# Patient Record
Sex: FEMALE | Race: BLACK OR AFRICAN AMERICAN | NOT HISPANIC OR LATINO | Employment: FULL TIME | URBAN - METROPOLITAN AREA
[De-identification: names, ages, dates, MRNs, and addresses within clinical notes are randomized per-mention and may not be internally consistent; named-entity substitution may affect disease eponyms.]

---

## 2022-05-26 ENCOUNTER — TELEPHONE (OUTPATIENT)
Dept: BARIATRICS | Facility: CLINIC | Age: 57
End: 2022-05-26

## 2022-05-26 ENCOUNTER — OFFICE VISIT (OUTPATIENT)
Dept: BARIATRICS | Facility: CLINIC | Age: 57
End: 2022-05-26
Payer: COMMERCIAL

## 2022-05-26 VITALS
SYSTOLIC BLOOD PRESSURE: 100 MMHG | BODY MASS INDEX: 39.56 KG/M2 | TEMPERATURE: 98.4 F | HEIGHT: 64 IN | HEART RATE: 86 BPM | DIASTOLIC BLOOD PRESSURE: 62 MMHG | WEIGHT: 231.7 LBS

## 2022-05-26 DIAGNOSIS — E66.9 OBESITY, CLASS II, BMI 35-39.9: Primary | ICD-10-CM

## 2022-05-26 DIAGNOSIS — Z12.31 ENCOUNTER FOR SCREENING MAMMOGRAM FOR MALIGNANT NEOPLASM OF BREAST: ICD-10-CM

## 2022-05-26 DIAGNOSIS — Z98.84 HISTORY OF BARIATRIC SURGERY: ICD-10-CM

## 2022-05-26 DIAGNOSIS — Z12.11 SCREENING FOR COLON CANCER: ICD-10-CM

## 2022-05-26 PROBLEM — E66.812 OBESITY, CLASS II, BMI 35-39.9: Status: ACTIVE | Noted: 2022-05-26

## 2022-05-26 PROCEDURE — 99204 OFFICE O/P NEW MOD 45 MIN: CPT | Performed by: NURSE PRACTITIONER

## 2022-05-26 NOTE — PROGRESS NOTES
0800:   Bedside report received from Thai Crockett RN, assumed care of pt. Rodney dual verified. 1030: Pt's wife at bedside, updates given. 1135: CVVH stopped in preparation for procedure. Successful return of blood (165 mL). 1114: Telephone report given to Sammie in cath lab. 1200: Pt transported to cath lab with this RN, RT, and cath lab RNs. Pt's wife aware and is going to [address] [place] [address] waiting room. 1316: Telephone report received from [employer] in cath lab. Pt will be up shortly. 1330: Spoke with Luis RN, aware that patient need to be put back on CVVH. 1350: Pt back in room. Left chest site clean, dry, intact. 1415: Pt's wife at bedside, updates given. 1425: FMS removed, logan care provided. 1445: Levophed gtt started to maintain SBP > 100. Bladder scanned pt- 51 mL shown. 1455: Pt's daughter at bedside, updates given. 1614: GI PA at bedside. Updates given. Asked about nutrition- OK for trickle feeds to the stomach, not the duodenum. 1622: CVVH restarted by Luis VASQUEZ. 1630: Dr. Pankaj Malone at bedside. Aware of GI reccs. Also discussed pt's agitation & restlessness while trying to wean propofol. Orders obtained for precedex. 1750: Dopamine gtt weaned off. 
 
9436: Tube feed started via OGT per orders. 2000: Bedside and Verbal shift change report given to Jackie TYLER (oncoming nurse) by Nusrat Frazier (offgoing nurse). Report included the following information SBAR, Kardex, MAR, Recent Results and Cardiac Rhythm Paced/NSR. Assessment/Plan:  Ifeanyi Ibrahim was seen today for consult  Diagnoses and all orders for this visit:    Obesity, Class II, BMI 35-39 9  - Discussed options of HealthyCORE-Intensive Lifestyle Intervention Program, Very Low Calorie Diet-VLCD and Conservative Program and the role of weight loss medications  - Explained the importance of making lifestyle changes first before starting any anti-obesity medications  Patient should demonstrate lifestyle changes first before anti-obesity medication can be initiated  - Patient is interested in pursuing Conservative Program +/- meal planning with dietician - patient will think about it  - Initial weight loss goal of 5-10% weight loss for improved health  - Weight loss can improve patient's co-morbid conditions and/or prevent weight-related complications  - Stop Dorita Carreradle 3/8  - Check screening labs: CMP, A1C, fasting insulin, TSH, and lipid panel  - She is interested in weight loss medications  She will check coverage of weight loss medications and update me at next office visit  - Was on Adderall before for ADHD, no side effects, but tapered off because she did not want to be on this long term  - Was on Topamax in past for weight loss, not helpful  Goals:  Do not skip meals  Food log (ie ) www myfitnesspal com,sparkpeople  com,loseit com,calorieking  com,etc  baritastic (use skinnytaste  com, dietdoctor  com or smartphone aramis [employer] for recipes)  No sugary beverages  At least 64oz of water daily  Increase physical activity by 10 minutes daily  Gradually increase physical activity to a goal of 5 days per week for 30 minutes of MODERATE intensity PLUS 2 days per week of FULL BODY resistance training (use smartphone apps [employer], Home Workout, etc )   - Start food logging, weighing and measuring including creamer and milk  - Keep up the great work with going to the gym - increase physical activity to 5 days per week for goal of 30 minutes     - Increase water to goal of at least 64 oz daily  Be mindful of creamer  Screening for colon cancer  -     Ambulatory referral to Gastroenterology; Future    Encounter for screening mammogram for malignant neoplasm of breast  -     Mammo diagnostic bilateral w 3d & cad; Future    History of bariatric surgery  - S/P gastric sleeve 8-9 years ago in [facility]  Will refer to surgical JUDD for routine follow-up  Follow up in approximately 2 months with Non-Surgical Physician/Advanced Practitioner  Subjective:   Chief Complaint   Patient presents with    Consult     MWM goal wt 150, S/B 3-8 neg        Patient ID: Laurence Spear  is a 61 y o  female with excess weight/obesity here to pursue weight management  Previous notes and records have been reviewed  Past Medical History:   Diagnosis Date    ADHD      Past Surgical History:   Procedure Laterality Date    SLEEVE GASTROPLASTY      surgery done in [facility]       HPI:  Wt Readings from Last 20 Encounters:   05/26/22 105 kg (231 lb 11 2 oz)     Obesity/Excess Weight:  Severity: Severe  Onset:  Since childhood, but worsened 7577-7246 and more so in the setting of the COVID-19 pandemic    Modifiers: Diet and Exercise and weight loss surgery 8-9 years ago  Contributing factors: Poor Food Choices and Stress/Emotional Eating  Associated symptoms: fatigue, decreased self esteem and clothes do not fit    S/P Sleeve Gastrectomy about 8-9 years ago in [facility]  Weight prior to surgery 260 lbs, mary 180 lbs few years after surgery  Has been regaining weight  Was previously on Adderall for ADHD, no side effects, but tapered off because she did not want to be on medications  Was on Topamax for weight loss in the past, not helpful  Postmenopausal - LMP 10 years ago       Hydration: 1 bottle of water, 20 oz coffee with creamer or whole milk or powdered creamer, 3-4 cups diet iced tea per month   Alcohol: 1 glass of wine every 2 weeks  Smoking: denies  Exercise: I am [employer] gym 3 days per week - 30 minutes with  then elliptical 20-30 minutes  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  Sleep: 5-6 hours  STOP bang: 3/8    Highest weight: 260 lbs  Current weight: 231 3 lbs  Goal weight: 150 lbs    Colonoscopy: due - referral placed  Mammogram: due - ordered    The following portions of the patient's history were reviewed and updated as appropriate: allergies, current medications, past family history, past medical history, past social history, past surgical history, and problem list     Review of Systems   HENT: Negative for sore throat  Respiratory: Negative for cough and shortness of breath  Cardiovascular: Negative for chest pain and palpitations  Gastrointestinal: Positive for abdominal pain (intermittent - previously evaluated) and constipation (advised to discuss with GI)  Negative for diarrhea, nausea and vomiting         + GERD - diet controlled   Endocrine: Negative for cold intolerance and heat intolerance  Genitourinary: Negative for dysuria  Musculoskeletal: Negative for arthralgias and back pain  Skin: Negative for rash  Neurological: Negative for headaches  Psychiatric/Behavioral: Negative for suicidal ideas (or HI)  + depression and anxiety - situational        Objective:  /62 (BP Location: Right arm, Patient Position: Sitting, Cuff Size: Standard)   Pulse 86   Temp 98 4 °F (36 9 °C) (Tympanic)   Ht 5' 4" (1 626 m)   Wt 105 kg (231 lb 11 2 oz)   BMI 39 77 kg/m²     Physical Exam  Vitals and nursing note reviewed  Constitutional   General appearance: Abnormal   well developed and morbidly obese  Eyes No conjunctival injection  Ears, Nose, Mouth, and Throat Oral mucosa moist    Pulmonary   Respiratory effort: No increased work of breathing or signs of respiratory distress  Cardiovascular    Examination of extremities for edema and/or varicosities: Normal   no edema  Abdomen   Abdomen: Abnormal   The abdomen was obese  Musculoskeletal   Gait and station: Normal     Psychiatric   Orientation to person, place and time: Normal     Affect: appropriate

## 2022-05-26 NOTE — TELEPHONE ENCOUNTER
Pt brought over from medical side, pt has hx sleeve gastrectomy in 2012  Pt unable to obtain op notes as it was done in [facility]  PCP records in Epic ([facility])  Transfer form given to Nhung

## 2022-06-01 DIAGNOSIS — Z98.84 BARIATRIC SURGERY STATUS: Primary | ICD-10-CM

## 2022-06-09 ENCOUNTER — TELEPHONE (OUTPATIENT)
Dept: GASTROENTEROLOGY | Facility: CLINIC | Age: 57
End: 2022-06-09

## 2022-06-09 NOTE — TELEPHONE ENCOUNTER
Scheduled date of colonoscopy (as of today):08 22 22  Physician performing colonoscopy:DR OCHOA  Location of colonoscopy:  Bowel prep reviewed with patient:DULCOLAX/MIRALAX  Instructions reviewed with patient by:DANIELLE VERBALLY/MAILED  Clearances: N/A      06/09/22  Screened by: [facility]    Referring Provider RAZA Crowder    Pre- Screening: Body mass index is 39 77 kg/m²  Has patient been referred for a routine screening Colonoscopy? yes  Is the patient between 39-70 years old? yes      Previous Colonoscopy yes   If yes:    Date:  Years ago    Facility:     Reason:       SCHEDULING STAFF: If the patient is between 45yrs-49yrs, please advise patient to confirm benefits/coverage with their insurance company for a routine screening colonoscopy, some insurance carriers will only cover at [address] or older  If the patient is over 66years old, please schedule an office visit  Does the patient want to see a Gastroenterologist prior to their procedure OR are they having any GI symptoms? no    Has the patient been hospitalized or had abdominal surgery in the past 6 months? no    Does the patient use supplemental oxygen? no    Does the patient take Coumadin, Lovenox, Plavix, Elliquis, Xarelto, or other blood thinning medication? no    Has the patient had a stroke, cardiac event, or stent placed in the past year? no    SCHEDULING STAFF: If patient answers NO to above questions, then schedule procedure  If patient answers YES to above questions, then schedule office appointment  If patient is between 45yrs - 49yrs, please advise patient that we will have to confirm benefits & coverage with their insurance company for a routine screening colonoscopy          Nurys Krause , call back #   [phone]

## 2022-06-20 ENCOUNTER — HOSPITAL ENCOUNTER (OUTPATIENT)
Dept: RADIOLOGY | Facility: HOSPITAL | Age: 57
Discharge: HOME | End: 2022-06-20
Attending: SURGERY
Payer: COMMERCIAL

## 2022-06-20 DIAGNOSIS — Z98.84 BARIATRIC SURGERY STATUS: ICD-10-CM

## 2022-06-20 PROCEDURE — 74240 X-RAY XM UPR GI TRC 1CNTRST: CPT

## 2022-06-21 ENCOUNTER — HOSPITAL ENCOUNTER (OUTPATIENT)
Dept: MAMMOGRAPHY | Facility: CLINIC | Age: 57
Discharge: HOME | End: 2022-06-21
Payer: COMMERCIAL

## 2022-06-21 VITALS — BODY MASS INDEX: 39.44 KG/M2 | WEIGHT: 231 LBS | HEIGHT: 64 IN

## 2022-06-21 DIAGNOSIS — Z12.31 ENCOUNTER FOR SCREENING MAMMOGRAM FOR MALIGNANT NEOPLASM OF BREAST: ICD-10-CM

## 2022-06-21 PROCEDURE — 77063 BREAST TOMOSYNTHESIS BI: CPT

## 2022-06-21 PROCEDURE — 77067 SCR MAMMO BI INCL CAD: CPT

## 2022-07-06 ENCOUNTER — TELEPHONE (OUTPATIENT)
Dept: BARIATRICS | Facility: CLINIC | Age: 57
End: 2022-07-06

## 2022-07-07 NOTE — TELEPHONE ENCOUNTER
Colon rescheduled to 9/12/22 with [facility]  New paperwork mailed to the patient

## 2022-07-07 NOTE — TELEPHONE ENCOUNTER
Received message from office staff that [facility] had returned my call  Called her back and left voice mail message for her to call the office back

## 2022-07-12 ENCOUNTER — APPOINTMENT (OUTPATIENT)
Dept: LAB | Facility: MEDICAL CENTER | Age: 57
End: 2022-07-12
Payer: COMMERCIAL

## 2022-07-12 DIAGNOSIS — E66.9 OBESITY, CLASS II, BMI 35-39.9: ICD-10-CM

## 2022-07-12 LAB
ALBUMIN SERPL BCP-MCNC: 3.6 G/DL (ref 3.5–5)
ALP SERPL-CCNC: 66 U/L (ref 46–116)
ALT SERPL W P-5'-P-CCNC: 16 U/L (ref 12–78)
ANION GAP SERPL CALCULATED.3IONS-SCNC: 5 MMOL/L (ref 4–13)
AST SERPL W P-5'-P-CCNC: 16 U/L (ref 5–45)
BILIRUB SERPL-MCNC: 0.44 MG/DL (ref 0.2–1)
BUN SERPL-MCNC: 14 MG/DL (ref 5–25)
CALCIUM SERPL-MCNC: 9.4 MG/DL (ref 8.3–10.1)
CHLORIDE SERPL-SCNC: 106 MMOL/L (ref 100–108)
CHOLEST SERPL-MCNC: 184 MG/DL
CO2 SERPL-SCNC: 28 MMOL/L (ref 21–32)
CREAT SERPL-MCNC: 0.82 MG/DL (ref 0.6–1.3)
EST. AVERAGE GLUCOSE BLD GHB EST-MCNC: 114 MG/DL
GFR SERPL CREATININE-BSD FRML MDRD: 79 ML/MIN/1.73SQ M
GLUCOSE P FAST SERPL-MCNC: 81 MG/DL (ref 65–99)
HBA1C MFR BLD: 5.6 %
HDLC SERPL-MCNC: 59 MG/DL
INSULIN SERPL-ACNC: 4.5 MU/L (ref 3–25)
LDLC SERPL CALC-MCNC: 111 MG/DL (ref 0–100)
NONHDLC SERPL-MCNC: 125 MG/DL
POTASSIUM SERPL-SCNC: 4 MMOL/L (ref 3.5–5.3)
PROT SERPL-MCNC: 7.8 G/DL (ref 6.4–8.2)
SODIUM SERPL-SCNC: 139 MMOL/L (ref 136–145)
TRIGL SERPL-MCNC: 70 MG/DL
TSH SERPL DL<=0.05 MIU/L-ACNC: 0.59 UIU/ML (ref 0.45–4.5)

## 2022-07-12 PROCEDURE — 80053 COMPREHEN METABOLIC PANEL: CPT

## 2022-07-12 PROCEDURE — 83036 HEMOGLOBIN GLYCOSYLATED A1C: CPT

## 2022-07-12 PROCEDURE — 84443 ASSAY THYROID STIM HORMONE: CPT

## 2022-07-12 PROCEDURE — 80061 LIPID PANEL: CPT

## 2022-07-12 PROCEDURE — 83525 ASSAY OF INSULIN: CPT

## 2022-07-12 PROCEDURE — 36415 COLL VENOUS BLD VENIPUNCTURE: CPT

## 2022-07-13 NOTE — TELEPHONE ENCOUNTER
Did not hear back yet from patient  Another voice mail message left to review mammogram and blood work results

## 2022-07-18 ENCOUNTER — TELEPHONE (OUTPATIENT)
Dept: BARIATRICS | Facility: CLINIC | Age: 57
End: 2022-07-18

## 2022-07-18 NOTE — TELEPHONE ENCOUNTER
Left voice message for patient to call Central Scheduling  to schedule a UGI, and to then call our office  to schedule an appointment with the surgeon Dr Marcie Chaves

## 2022-07-18 NOTE — TELEPHONE ENCOUNTER
Patient returned phone call to speak to Anh, in regards to results  She would prefer a call back in the hours between 9am-12noon  She works night shift and is not available after 12noon  Call back# [phone]

## 2022-07-19 NOTE — TELEPHONE ENCOUNTER
Spoke with patient regarding need for ultrasound of right breast based on mammogram recommendations  Also, reviewed lipid panel, TSH, fasting insulin, A1C, and CMP  LDL slightly elevated and will likely improve with weight loss and lifestyle modifications  Otherwise labs within acceptable range  She has questions pertaining to the message left from surgical weight management  She already had the UGI and needs an appointment with surgery  I will ask surgical weight management to give her a call to clarify

## 2022-07-27 ENCOUNTER — OFFICE VISIT (OUTPATIENT)
Dept: BARIATRICS | Facility: CLINIC | Age: 57
End: 2022-07-27
Payer: COMMERCIAL

## 2022-07-27 VITALS
DIASTOLIC BLOOD PRESSURE: 76 MMHG | WEIGHT: 228 LBS | SYSTOLIC BLOOD PRESSURE: 108 MMHG | HEART RATE: 64 BPM | BODY MASS INDEX: 38.93 KG/M2 | HEIGHT: 64 IN | TEMPERATURE: 97.2 F

## 2022-07-27 DIAGNOSIS — Z98.84 BARIATRIC SURGERY STATUS: Primary | ICD-10-CM

## 2022-07-27 DIAGNOSIS — K21.9 GASTROESOPHAGEAL REFLUX DISEASE, UNSPECIFIED WHETHER ESOPHAGITIS PRESENT: ICD-10-CM

## 2022-07-27 PROCEDURE — 99203 OFFICE O/P NEW LOW 30 MIN: CPT | Performed by: SURGERY

## 2022-07-27 NOTE — PROGRESS NOTES
Consultation - Bariatric Surgery   Uche Galindo 61 y o  female MRN: [id number]  Unit/Bed#:  Encounter: [id number]    Assessment/Plan     Assessment:   Uche Galindo is a 61 y o  female Body mass index is 39 14 kg/m²  with history of Lap sleeve gastrectomy in 2012 in [facility] with recidivism and symptoms of GERD  Plan:  - Medical Weight Loss for 3-6 months (level 2) and then will re-evaluate  - Patient is due for a screening endoscopy, which will also assist in evaluation of her anatomy  However, she was charged a $500 copay by her insurance company  She would like to hold off on EGD until this sorted  History of Present Illness     HPI:  Uche Galindo is a 61 y o  female Body mass index is 39 14 kg/m²  with history of Lap sleeve gastrectomy in 2012 in [facility] presents for evaluation of recidivism  She has not had bariatric  follow up since surgery  She is interested in pursuing Medical Weight Management  She has tried intermittent fasting and goes to the gym 3x/week with little affect  PreOp:  266lbs  Mina: 165  Current: 228lbs    Current: She does have heartburn daily, particularly after meals  She is unable to recline after meals  She is particularly troubled by regurgitation symptoms  She takes OTC antacids, including Prilosec and TUMS  She has tried dietary modification along with lifestyle changes  Review of Systems   Constitutional: Negative  HENT: Negative  Eyes: Negative  Respiratory: Negative  Cardiovascular: Negative  Gastrointestinal: Negative  Endocrine: Negative  Genitourinary: Negative  Musculoskeletal: Negative  Skin: Negative  Allergic/Immunologic: Negative  Neurological: Negative  Hematological: Negative  Psychiatric/Behavioral: Negative  All other systems reviewed and are negative        Historical Information   Past Medical History:   Diagnosis Date    ADHD      Past Surgical History:   Procedure Laterality Date    SLEEVE GASTROPLASTY surgery done in [facility] History   Social History     Substance and Sexual Activity   Alcohol Use Never     Social History     Substance and Sexual Activity   Drug Use Never     Social History     Tobacco Use   Smoking Status Never Smoker   Smokeless Tobacco Never Used     Family History: non-contributory    Meds/Allergies   all current active meds have been reviewed  No Known Allergies    Objective   First Vitals:   @VSFIRST2(5,8,6,7,9,11,14,10:FIRST)@    Current Vitals:   Blood Pressure: 108/76 (07/27/22 0853)  Pulse: 64 (07/27/22 0853)  Temperature: (!) 97 2 °F (36 2 °C) (07/27/22 0853)  Temp Source: Tympanic (07/27/22 0853)  Height: 5' 4" (162 6 cm) (07/27/22 0853)  Weight - Scale: 103 kg (228 lb) (07/27/22 0853)    [unfilled]    Invasive Devices  Report    None                 Physical Exam  Vitals and nursing note reviewed  Constitutional:       Appearance: Normal appearance  HENT:      Head: Normocephalic and atraumatic  Nose: Nose normal       Mouth/Throat:      Mouth: Mucous membranes are moist       Pharynx: Oropharynx is clear  Eyes:      Extraocular Movements: Extraocular movements intact  Pupils: Pupils are equal, round, and reactive to light  Cardiovascular:      Rate and Rhythm: Normal rate and regular rhythm  Pulses: Normal pulses  Pulmonary:      Effort: Pulmonary effort is normal    Abdominal:      General: Abdomen is flat  Bowel sounds are normal       Palpations: Abdomen is soft  Musculoskeletal:         General: Normal range of motion  Cervical back: Normal range of motion and neck supple  Skin:     General: Skin is warm and dry  Neurological:      General: No focal deficit present  Mental Status: She is alert and oriented to person, place, and time  Mental status is at baseline  Psychiatric:         Mood and Affect: Mood normal          Behavior: Behavior normal          Thought Content:  Thought content normal  Judgment: Judgment normal          Lab Results: I have personally reviewed pertinent lab results  Imaging: I have personally reviewed pertinent reports  EKG, Pathology, and Other Studies: I have personally reviewed pertinent reports

## 2022-07-28 ENCOUNTER — HOSPITAL ENCOUNTER (OUTPATIENT)
Dept: MAMMOGRAPHY | Facility: CLINIC | Age: 57
Discharge: HOME | End: 2022-07-28
Payer: COMMERCIAL

## 2022-07-28 ENCOUNTER — TELEPHONE (OUTPATIENT)
Dept: BARIATRICS | Facility: CLINIC | Age: 57
End: 2022-07-28

## 2022-07-28 DIAGNOSIS — R92.8 ABNORMAL MAMMOGRAM: ICD-10-CM

## 2022-07-28 PROCEDURE — 76642 ULTRASOUND BREAST LIMITED: CPT

## 2022-08-19 ENCOUNTER — OFFICE VISIT (OUTPATIENT)
Dept: BARIATRICS | Facility: CLINIC | Age: 57
End: 2022-08-19
Payer: COMMERCIAL

## 2022-08-19 VITALS
HEART RATE: 88 BPM | WEIGHT: 228.1 LBS | BODY MASS INDEX: 38.94 KG/M2 | HEIGHT: 64 IN | OXYGEN SATURATION: 97 % | SYSTOLIC BLOOD PRESSURE: 102 MMHG | DIASTOLIC BLOOD PRESSURE: 71 MMHG | TEMPERATURE: 98.2 F

## 2022-08-19 DIAGNOSIS — E66.9 OBESITY, CLASS II, BMI 35-39.9: Primary | ICD-10-CM

## 2022-08-19 DIAGNOSIS — Z98.84 HISTORY OF BARIATRIC SURGERY: ICD-10-CM

## 2022-08-19 PROCEDURE — 99213 OFFICE O/P EST LOW 20 MIN: CPT | Performed by: NURSE PRACTITIONER

## 2022-08-19 RX ORDER — UBIDECARENONE 75 MG
CAPSULE ORAL DAILY
COMMUNITY

## 2022-08-19 NOTE — ASSESSMENT & PLAN NOTE
- Patient is pursuing Conservative Program  - Initial weight loss goal of 5-10% weight loss for improved health  - Labs reviewed: Lipid panel, TSH, fasting insulin, A1C, and CMP 7/12/2022  LDL elevated, which will likely improve with weight loss and lifestyle modifications  Otherwise, labs within acceptable range  - Was on Topamax in past for weight loss, not helpful  - Works nightshift and only eats 2 meals per day  I feel that she would benefit from menu planning with the dietician  She will think about this  - She will check coverage of weight loss medications and update me at the next office visit  - Importance of lifestyle changes along with weight loss medications discussed  - Patient denies personal history of pancreatitis  Patient also denies personal and family history of medullary thyroid cancer and multiple endocrine neoplasia type 2 (MEN 2 tumor)  Initial MWM: 231 3 lbs  Current: 228 1 lbs  Change: -3 2 lbs  Goal: 150 lbs    Goals:  Do not skip meals  Start food logging, weighing and measuring food and creamer  2876-4269 calories per day  80 grams protein  Increase water intake to at least 64 oz daily  Continue gym workouts

## 2022-08-19 NOTE — ASSESSMENT & PLAN NOTE
- S/P Sleeve Gastrectomy about 8-9 years ago in [address] [facility] July 2022  Revision discussed, but 3-6 months MWM recommended first   - Will schedule for annual with surgical JUDD

## 2022-08-19 NOTE — PROGRESS NOTES
Assessment/Plan:     Obesity, Class II, BMI 35-39 9  - Patient is pursuing Conservative Program  - Initial weight loss goal of 5-10% weight loss for improved health  - Labs reviewed: Lipid panel, TSH, fasting insulin, A1C, and CMP 7/12/2022  LDL elevated, which will likely improve with weight loss and lifestyle modifications  Otherwise, labs within acceptable range  - Was on Topamax in past for weight loss, not helpful  - Works nightshift and only eats 2 meals per day  I feel that she would benefit from menu planning with the dietician  She will think about this  - She will check coverage of weight loss medications and update me at the next office visit  - Importance of lifestyle changes along with weight loss medications discussed  - Patient denies personal history of pancreatitis  Patient also denies personal and family history of medullary thyroid cancer and multiple endocrine neoplasia type 2 (MEN 2 tumor)  Initial MWM: 231 3 lbs  Current: 228 1 lbs  Change: -3 2 lbs  Goal: 150 lbs    Goals:  Do not skip meals  Start food logging, weighing and measuring food and creamer  7018-8150 calories per day  80 grams protein  Increase water intake to at least 64 oz daily  Continue gym workouts  History of bariatric surgery  - S/P Sleeve Gastrectomy about 8-9 years ago [facility] July 2022  Revision discussed, but 3-6 months MWM recommended first   - Will schedule for annual with surgical JUDD  Gabe was seen today for follow-up  Diagnoses and all orders for this visit:    Obesity, Class II, BMI 35-39 9    History of bariatric surgery        Follow up in approximately 2 months with Non-Surgical Physician/Advanced Practitioner  Will also schedule her for annual with surgical JUDD  Subjective:   Chief Complaint   Patient presents with    Follow-up     MWM 2 mth post op wt gain fu         Patient ID: Ricardo Ugalde  is a 61 y o  female with excess weight/obesity here to pursue weight management  Patient is pursuing Conservative Program    Most recent notes and records were reviewed  HPI    Wt Readings from Last 10 Encounters:   08/19/22 103 kg (228 lb 1 6 oz)   07/27/22 103 kg (228 lb)   06/21/22 105 kg (231 lb)   05/26/22 105 kg (231 lb 11 2 oz)       S/P Sleeve Gastrectomy about 8-9 years ago in [facility]  Weight prior to surgery 260 lbs, mary 180 lbs few years after surgery  Has been regaining weight       Was previously on Adderall for ADHD, no side effects, but tapered off because she did not want to be on medications  Was on Topamax for weight loss in the past, not helpful  Postmenopausal - LMP 10 years ago  Not food logging  Recently returned from [place]  Was not following diet on vacation  Following 30/60 rule  Having a lot of food cravings  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    3 am-5 am: [employer] meal - mac and cheese and steak  10 am-11 am: leftovers     Hydration: 1 bottle of water, 20 oz coffee with creamer or whole milk or powdered creamer, 3-4 cups diet iced tea per month   Alcohol: 1 glass of wine every 2 weeks  Exercise: [employer] am [employer] gym 3 days per week - 30 minutes with  then elliptical 20-30 minutes     Colonoscopy: scheduled 9/12/2022  Mammogram: UTD, due June 2023          The following portions of the patient's history were reviewed and updated as appropriate: allergies, current medications, past family history, past medical history, past social history, past surgical history, and problem list     Review of Systems   HENT: Negative for sore throat  Respiratory: Negative for cough and shortness of breath  Cardiovascular: Negative for chest pain and palpitations  Gastrointestinal: Positive for constipation (chronic)  Negative for abdominal pain, diarrhea, nausea and vomiting         + GERD diet controlled   Skin: Negative for rash  Psychiatric/Behavioral: Negative for suicidal ideas (or HI)  Denies depression and anxiety       Objective:  /71 (BP Location: Left arm, Patient Position: Sitting, Cuff Size: Standard)   Pulse 88   Temp 98 2 °F (36 8 °C) (Tympanic)   Ht 5' 4" (1 626 m)   Wt 103 kg (228 lb 1 6 oz)   SpO2 97%   BMI 39 15 kg/m²     Physical Exam  Vitals and nursing note reviewed  Constitutional   General appearance: Abnormal   well developed and obese  Eyes No conjunctival injection  Ears, Nose, Mouth, and Throat Oral mucosa moist    Pulmonary   Respiratory effort: No increased work of breathing or signs of respiratory distress  Cardiovascular     Examination of extremities for edema and/or varicosities: Normal   no edema  Abdomen   Abdomen: Abnormal   The abdomen was obese      Musculoskeletal   Gait and station: Normal     Psychiatric   Orientation to person, place and time: Normal     Affect: appropriate

## 2022-09-07 ENCOUNTER — TELEPHONE (OUTPATIENT)
Dept: GASTROENTEROLOGY | Facility: MEDICAL CENTER | Age: 57
End: 2022-09-07

## 2022-09-07 NOTE — TELEPHONE ENCOUNTER
Patients GI provider:  Dr Cherelle Alex    Number to return call: [phone]    Reason for call: Pt calling to reschedule colonoscopy    Scheduled procedure/appointment date if applicable: Procedure 8/46/38

## 2022-09-15 ENCOUNTER — TELEPHONE (OUTPATIENT)
Dept: GASTROENTEROLOGY | Facility: CLINIC | Age: 57
End: 2022-09-15

## 2022-09-15 NOTE — TELEPHONE ENCOUNTER
Called to see about rescheduling 09/21/2022 to 09/22/2022 @ [facility] due to scheduling issues  Pt answered but was unable to have a conversion and asked if I could call back and leave a message  I called back immediately but I was unable to leave a message because the inbox was full  Another attempt was made about 2 hours later

## 2022-09-19 RX ORDER — SODIUM CHLORIDE 9 MG/ML
125 INJECTION, SOLUTION INTRAVENOUS CONTINUOUS
Status: CANCELLED | OUTPATIENT
Start: 2022-09-19

## 2022-09-21 ENCOUNTER — ANESTHESIA (OUTPATIENT)
Dept: GASTROENTEROLOGY | Facility: MEDICAL CENTER | Age: 57
End: 2022-09-21

## 2022-09-21 ENCOUNTER — HOSPITAL ENCOUNTER (OUTPATIENT)
Dept: GASTROENTEROLOGY | Facility: MEDICAL CENTER | Age: 57
Setting detail: OUTPATIENT SURGERY
Discharge: HOME | End: 2022-09-21
Attending: INTERNAL MEDICINE
Payer: COMMERCIAL

## 2022-09-21 ENCOUNTER — ANESTHESIA EVENT (OUTPATIENT)
Dept: GASTROENTEROLOGY | Facility: MEDICAL CENTER | Age: 57
End: 2022-09-21

## 2022-09-21 VITALS
TEMPERATURE: 97.1 F | DIASTOLIC BLOOD PRESSURE: 70 MMHG | WEIGHT: 228 LBS | RESPIRATION RATE: 20 BRPM | SYSTOLIC BLOOD PRESSURE: 110 MMHG | HEIGHT: 64 IN | BODY MASS INDEX: 38.93 KG/M2 | HEART RATE: 64 BPM | OXYGEN SATURATION: 99 %

## 2022-09-21 DIAGNOSIS — Z12.11 SPECIAL SCREENING FOR MALIGNANT NEOPLASMS, COLON: ICD-10-CM

## 2022-09-21 PROCEDURE — 45380 COLONOSCOPY AND BIOPSY: CPT | Performed by: INTERNAL MEDICINE

## 2022-09-21 PROCEDURE — 45385 COLONOSCOPY W/LESION REMOVAL: CPT | Performed by: INTERNAL MEDICINE

## 2022-09-21 PROCEDURE — 88342 IMHCHEM/IMCYTCHM 1ST ANTB: CPT | Performed by: SPECIALIST

## 2022-09-21 PROCEDURE — 88341 IMHCHEM/IMCYTCHM EA ADD ANTB: CPT | Performed by: SPECIALIST

## 2022-09-21 PROCEDURE — 88305 TISSUE EXAM BY PATHOLOGIST: CPT | Performed by: SPECIALIST

## 2022-09-21 RX ORDER — LIDOCAINE HYDROCHLORIDE 20 MG/ML
INJECTION, SOLUTION EPIDURAL; INFILTRATION; INTRACAUDAL; PERINEURAL AS NEEDED
Status: DISCONTINUED | OUTPATIENT
Start: 2022-09-21 | End: 2022-09-21

## 2022-09-21 RX ORDER — PROPOFOL 10 MG/ML
INJECTION, EMULSION INTRAVENOUS AS NEEDED
Status: DISCONTINUED | OUTPATIENT
Start: 2022-09-21 | End: 2022-09-21

## 2022-09-21 RX ORDER — PROPOFOL 10 MG/ML
INJECTION, EMULSION INTRAVENOUS CONTINUOUS PRN
Status: DISCONTINUED | OUTPATIENT
Start: 2022-09-21 | End: 2022-09-21

## 2022-09-21 RX ORDER — SODIUM CHLORIDE 9 MG/ML
INJECTION, SOLUTION INTRAVENOUS CONTINUOUS PRN
Status: DISCONTINUED | OUTPATIENT
Start: 2022-09-21 | End: 2022-09-21

## 2022-09-21 RX ADMIN — LIDOCAINE HYDROCHLORIDE 100 MG: 20 INJECTION, SOLUTION EPIDURAL; INFILTRATION; INTRACAUDAL at 09:54

## 2022-09-21 RX ADMIN — PROPOFOL 150 MG: 10 INJECTION, EMULSION INTRAVENOUS at 09:54

## 2022-09-21 RX ADMIN — PROPOFOL 90 MCG/KG/MIN: 10 INJECTION, EMULSION INTRAVENOUS at 09:54

## 2022-09-21 RX ADMIN — SODIUM CHLORIDE: 0.9 INJECTION, SOLUTION INTRAVENOUS at 09:54

## 2022-09-21 NOTE — H&P
History and Physical -  [facility]  Cal Lane 61 y o  female MRN: [id number]                  HPI: Cal Lane is a 61y o  year old female who presents for colon cancer screening  REVIEW OF SYSTEMS: Per the HPI, and otherwise unremarkable  Historical Information   Past Medical History:   Diagnosis Date    ADHD      Past Surgical History:   Procedure Laterality Date    SLEEVE GASTROPLASTY      surgery done in [address] [place] [address] Se       Social History   Social History     Substance and Sexual Activity   Alcohol Use Never     Social History     Substance and Sexual Activity   Drug Use Never     Social History     Tobacco Use   Smoking Status Never Smoker   Smokeless Tobacco Never Used     Family History   Problem Relation Age of Onset    No Known Problems Mother     Hypertension Father     Diabetes Father     Cancer Sister     Cervical cancer Sister     No Known Problems Daughter     No Known Problems Maternal Grandmother     No Known Problems Maternal Grandfather     No Known Problems Paternal Grandmother     Stroke Paternal Grandfather     Heart disease Neg Hx     Thyroid disease Neg Hx     Hyperlipidemia Neg Hx        Meds/Allergies       Current Outpatient Medications:     cyanocobalamin (VITAMIN B-12) 100 mcg tablet    No Known Allergies    Objective     There were no vitals taken for this visit  PHYSICAL EXAM    Gen: NAD  Head: NCAT  CV: RRR  CHEST: Clear  ABD: soft, NT/ND  EXT: no edema      ASSESSMENT/PLAN:  This is a 61y o  year old female here for colonoscopy, and she is stable and optimized for her procedure

## 2022-09-21 NOTE — ANESTHESIA POSTPROCEDURE EVALUATION
Post-Op Assessment Note    CV Status:  Stable    Pain management: adequate     Mental Status:  Alert and awake   Hydration Status:  Euvolemic   PONV Controlled:  Controlled   Airway Patency:  Patent      Post Op Vitals Reviewed: Yes      Staff: Anesthesiologist         No complications documented      BP (!) 86/53 (09/21/22 1011)    Temp     Pulse 73 (09/21/22 1011)   Resp 20 (09/21/22 1011)    SpO2 96 % (09/21/22 1011)

## 2022-09-21 NOTE — ANESTHESIA PREPROCEDURE EVALUATION
Procedure:  COLONOSCOPY    Relevant Problems   GI/HEPATIC   (+) History of bariatric surgery      Other   (+) ADD (attention deficit disorder)   (+) Obesity, Class II, BMI 35-39 9        Physical Exam    Airway    Mallampati score: II  TM Distance: >3 FB  Neck ROM: full     Dental       Cardiovascular  Rhythm: regular, Rate: normal, Cardiovascular exam normal    Pulmonary      Other Findings        Anesthesia Plan  ASA Score- 2     Anesthesia Type- IV sedation with anesthesia with ASA Monitors  Additional Monitors:   Airway Plan:           Plan Factors-Exercise tolerance (METS): >4 METS  Chart reviewed  Existing labs reviewed  Patient summary reviewed  Patient is not a current smoker  Obstructive sleep apnea risk education given perioperatively  Induction- intravenous  Postoperative Plan-     Informed Consent- Anesthetic plan and risks discussed with patient  [phone])

## 2022-09-28 PROCEDURE — 88305 TISSUE EXAM BY PATHOLOGIST: CPT | Performed by: SPECIALIST

## 2022-09-28 PROCEDURE — 88342 IMHCHEM/IMCYTCHM 1ST ANTB: CPT | Performed by: SPECIALIST

## 2022-09-28 PROCEDURE — 88341 IMHCHEM/IMCYTCHM EA ADD ANTB: CPT | Performed by: SPECIALIST

## 2022-09-29 NOTE — RESULT ENCOUNTER NOTE
My medical assistant will call her with her results  Polyps were not adenomas so repeat colonoscopy in 10 years

## 2023-07-17 ENCOUNTER — TELEPHONE (OUTPATIENT)
Dept: BARIATRICS | Facility: CLINIC | Age: 58
End: 2023-07-17

## 2023-08-10 ENCOUNTER — HOSPITAL ENCOUNTER (OUTPATIENT)
Dept: MAMMOGRAPHY | Facility: MEDICAL CENTER | Age: 58
Discharge: HOME | End: 2023-08-10
Payer: COMMERCIAL

## 2023-08-10 ENCOUNTER — TELEPHONE (OUTPATIENT)
Dept: BARIATRICS | Facility: CLINIC | Age: 58
End: 2023-08-10

## 2023-08-10 VITALS — BODY MASS INDEX: 38.93 KG/M2 | WEIGHT: 228 LBS | HEIGHT: 64 IN

## 2023-08-10 DIAGNOSIS — Z12.31 ENCOUNTER FOR SCREENING MAMMOGRAM FOR MALIGNANT NEOPLASM OF BREAST: ICD-10-CM

## 2023-08-10 PROCEDURE — 77063 BREAST TOMOSYNTHESIS BI: CPT

## 2023-08-10 PROCEDURE — 77067 SCR MAMMO BI INCL CAD: CPT

## 2023-08-24 ENCOUNTER — HOSPITAL ENCOUNTER (OUTPATIENT)
Dept: MAMMOGRAPHY | Facility: CLINIC | Age: 58
Discharge: HOME | End: 2023-08-24
Payer: COMMERCIAL

## 2023-08-24 VITALS — BODY MASS INDEX: 38.93 KG/M2 | HEIGHT: 64 IN | WEIGHT: 228 LBS

## 2023-08-24 DIAGNOSIS — R92.8 ABNORMAL MAMMOGRAM: ICD-10-CM

## 2023-08-24 PROCEDURE — 77065 DX MAMMO INCL CAD UNI: CPT

## 2023-08-25 ENCOUNTER — TELEPHONE (OUTPATIENT)
Dept: BARIATRICS | Facility: CLINIC | Age: 58
End: 2023-08-25

## 2023-08-25 DIAGNOSIS — R92.8 ABNORMAL MAMMOGRAM OF RIGHT BREAST: Primary | ICD-10-CM

## 2023-08-25 NOTE — TELEPHONE ENCOUNTER
Please let the patient know I reviewed her mammogram and the calcifications on the right are remaining stable, but continued surveillance is recommended. The radiologist is recommending a diagnostic mammogram of the right breast in 6 months. I have placed the order for that.

## 2024-02-16 ENCOUNTER — HOSPITAL ENCOUNTER (OUTPATIENT)
Dept: MAMMOGRAPHY | Facility: CLINIC | Age: 59
Discharge: HOME | End: 2024-02-16
Payer: COMMERCIAL

## 2024-02-16 VITALS — HEIGHT: 64 IN | WEIGHT: 228 LBS | BODY MASS INDEX: 38.93 KG/M2

## 2024-02-16 DIAGNOSIS — R92.8 ABNORMAL MAMMOGRAM OF RIGHT BREAST: ICD-10-CM

## 2024-02-16 PROCEDURE — 77065 DX MAMMO INCL CAD UNI: CPT

## 2024-02-20 ENCOUNTER — TELEPHONE (OUTPATIENT)
Dept: BARIATRICS | Facility: CLINIC | Age: 59
End: 2024-02-20

## 2024-02-20 NOTE — TELEPHONE ENCOUNTER
----- Message from RAZA Fleming sent at 2/16/2024  4:18 PM EST -----  Please let the patient know I reviewed her mammogram and the calcifications of the right breast are unchanged. The radiologist has recommended a diagnostic mammogram in 6 months for both breasts.

## 2024-09-06 ENCOUNTER — HOSPITAL ENCOUNTER (OUTPATIENT)
Dept: MAMMOGRAPHY | Facility: CLINIC | Age: 59
Discharge: HOME | End: 2024-09-06
Payer: COMMERCIAL

## 2024-09-06 VITALS — WEIGHT: 228 LBS | HEIGHT: 64 IN | BODY MASS INDEX: 38.93 KG/M2

## 2024-09-06 DIAGNOSIS — R92.8 ABNORMAL FINDINGS ON DIAGNOSTIC IMAGING OF BREAST: ICD-10-CM

## 2024-09-06 PROCEDURE — G0279 TOMOSYNTHESIS, MAMMO: HCPCS

## 2024-09-06 PROCEDURE — 77066 DX MAMMO INCL CAD BI: CPT

## 2025-08-14 ENCOUNTER — TELEPHONE (OUTPATIENT)
Dept: MAMMOGRAPHY | Facility: CLINIC | Age: 60
End: 2025-08-14